# Patient Record
(demographics unavailable — no encounter records)

---

## 2024-12-09 NOTE — PHYSICAL EXAM
[Well Developed] : well developed [Well Nourished] : well nourished [No Acute Distress] : no acute distress [Normal Conjunctiva] : normal conjunctiva [Normal Venous Pressure] : normal venous pressure [Normal S1, S2] : normal S1, S2 [Clear Lung Fields] : clear lung fields [Good Air Entry] : good air entry [No Respiratory Distress] : no respiratory distress  [Soft] : abdomen soft [Non Tender] : non-tender [Normal Bowel Sounds] : normal bowel sounds [Normal Gait] : normal gait [No Edema] : no edema [No Rash] : no rash [No Skin Lesions] : no skin lesions [Moves all extremities] : moves all extremities [No Focal Deficits] : no focal deficits [Normal Speech] : normal speech [Alert and Oriented] : alert and oriented

## 2024-12-09 NOTE — CARDIOLOGY SUMMARY
[de-identified] : normal sinus rhythm  [de-identified] : 9/10/24:   1. Left ventricular cavity is normal in size. Left ventricular wall thickness is normal. Left ventricular systolic function is normal with an ejection fraction of 57 % by Shipman's method of disks. There are no regional wall motion abnormalities seen.  2. There is mild (grade 1) left ventricular diastolic dysfunction.  3. Normal right ventricular cavity size, with normal wall thickness, and normal right ventricular systolic function.  4. Trace mitral regurgitation.  5. Trace tricuspid regurgitation.  6. No echocardiographic evidence of pulmonary hypertension.  7. No pericardial effusion seen.

## 2024-12-09 NOTE — HISTORY OF PRESENT ILLNESS
[FreeTextEntry1] : 63 year old female PMH: DM, HTN ,HLD here today for BP check after prescribing Aldactone which she is tolerating well and pressure shows significant improvement,  previously ended up in hospital few times , as patient felt palpitation /discomfort with anxiety /dehydration, with markedly elevated  blood pressure in September ,, patient denies shortness of breath , patient was discharged home, pressure now normotensive patient was not very keen on diet restriction However, she now follows a strict low sodium diet   went to restaurant while sitting she felt dizziness then passed out , recovered well immediately , patient say her blood pressure 120s   patient says she had CT head showed small bilateral chronic right greater than left  gangliocapsular lacunar infarction    patient denies any chest pain or shortness of breath on routine walk    No cardiovascular symptoms

## 2024-12-09 NOTE — DISCUSSION/SUMMARY
[FreeTextEntry1] : Patient  above hx   Labile hypertension: controlled on current medication regimen, is followed with nephrology  Advised to continue to follow low sodium States she had a renal US that showed no LUIS but did show a right kidney mass which she will be seen by urology for further work up ( Non [facility] Nephrologist she will provide copies of all records )   syncope  : possibly due to dehydration / uncontrolled hypertension: encourage patient to follow hydration  had echo showed normal ventricular function, pressure normotensive will obtain stress test ( pharm Nuclear unable to obtain MPHR with walking)   Mixed hyperlipidemia: continue diet restriction , atorvastatin 10 mg po daily   DM: medical management   OV 6 weeks

## 2025-01-16 NOTE — REASON FOR VISIT
[Symptom and Test Evaluation] : symptom and test evaluation [Hyperlipidemia] : hyperlipidemia [Hypertension] : hypertension [Other: ____] : [unfilled] [Arrhythmia/ECG Abnorrmalities] : arrhythmia/ECG abnormalities

## 2025-01-16 NOTE — HISTORY OF PRESENT ILLNESS
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

## 2025-01-16 NOTE — DISCUSSION/SUMMARY
[FreeTextEntry1] : Patient  above hx   Labile hypertension: controlled on current medication regimen, is followed with nephrology  Advised to continue to follow low sodium  States she had a renal US that showed no LUIS but did show a right kidney mass which she will be seen by urology for further work up ( Non [facility] Nephrologist she will provide copies of all records )   syncope  : possibly due to dehydration / uncontrolled hypertension: encourage patient to follow hydration  had echo showed normal ventricular function,  Normal nuclear stress test   PVCS : will obtain 2 week holter monitor , to assess PVC burden , rule out significant arrhythmia   Mixed hyperlipidemia: continue diet restriction , atorvastatin 10 mg po daily   DM: continue diet restriction and medications   will obtain her recent blood work    This visit was conducted as part of ongoing, longitudinal medical care for patient's chronic medical diagnoses and other issues.

## 2025-01-16 NOTE — CARDIOLOGY SUMMARY
[de-identified] : normal sinus rhythm  [de-identified] : 1/16/25 normal chemical nuclear stress test  PVCS  [de-identified] : 9/10/24  1. Left ventricular cavity is normal in size. Left ventricular wall thickness is normal. Left ventricular systolic function is normal with an ejection fraction of 57 % by Shipman's method of disks. There are no regional wall motion abnormalities seen.  2. There is mild (grade 1) left ventricular diastolic dysfunction.  3. Normal right ventricular cavity size, with normal wall thickness, and normal right ventricular systolic function.